# Patient Record
Sex: MALE | Race: WHITE | NOT HISPANIC OR LATINO | Employment: UNEMPLOYED | URBAN - NONMETROPOLITAN AREA
[De-identification: names, ages, dates, MRNs, and addresses within clinical notes are randomized per-mention and may not be internally consistent; named-entity substitution may affect disease eponyms.]

---

## 2019-06-26 ENCOUNTER — APPOINTMENT (OUTPATIENT)
Dept: CT IMAGING | Facility: HOSPITAL | Age: 24
End: 2019-06-26

## 2019-06-26 ENCOUNTER — HOSPITAL ENCOUNTER (EMERGENCY)
Facility: HOSPITAL | Age: 24
Discharge: HOME | End: 2019-06-26
Attending: EMERGENCY MEDICINE | Admitting: EMERGENCY MEDICINE

## 2019-06-26 VITALS
HEIGHT: 70 IN | HEART RATE: 74 BPM | BODY MASS INDEX: 20.24 KG/M2 | WEIGHT: 141.4 LBS | OXYGEN SATURATION: 100 % | DIASTOLIC BLOOD PRESSURE: 80 MMHG | SYSTOLIC BLOOD PRESSURE: 116 MMHG | TEMPERATURE: 98 F | RESPIRATION RATE: 18 BRPM
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
## 2019-06-26 DIAGNOSIS — S13.9XXA NECK SPRAIN, INITIAL ENCOUNTER: ICD-10-CM

## 2019-06-26 DIAGNOSIS — S00.03XA CONTUSION OF SCALP, INITIAL ENCOUNTER: ICD-10-CM

## 2019-06-26 PROCEDURE — 90715 TDAP VACCINE 7 YRS/> IM: CPT | Performed by: PHYSICIAN ASSISTANT

## 2019-06-26 PROCEDURE — 70450 CT HEAD/BRAIN W/O DYE: CPT

## 2019-06-26 PROCEDURE — 99283 EMERGENCY DEPT VISIT LOW MDM: CPT

## 2019-06-26 PROCEDURE — 72125 CT NECK SPINE W/O DYE: CPT

## 2019-06-26 PROCEDURE — 90471 IMMUNIZATION ADMIN: CPT | Performed by: PHYSICIAN ASSISTANT

## 2019-06-26 PROCEDURE — 25010000002 TDAP 5-2.5-18.5 LF-MCG/0.5 SUSPENSION: Performed by: PHYSICIAN ASSISTANT

## 2019-06-26 RX ADMIN — TETANUS TOXOID, REDUCED DIPHTHERIA TOXOID AND ACELLULAR PERTUSSIS VACCINE, ADSORBED 0.5 ML: 5; 2.5; 8; 8; 2.5 SUSPENSION INTRAMUSCULAR at 20:05

## 2019-07-29 ENCOUNTER — TRANSCRIBE ORDERS (OUTPATIENT)
Dept: ADMINISTRATIVE | Facility: HOSPITAL | Age: 24
End: 2019-07-29

## 2019-07-29 DIAGNOSIS — M54.2 CERVICALGIA: Primary | ICD-10-CM

## 2019-07-29 DIAGNOSIS — R51.9 NONINTRACTABLE HEADACHE, UNSPECIFIED CHRONICITY PATTERN, UNSPECIFIED HEADACHE TYPE: ICD-10-CM

## 2020-05-25 ENCOUNTER — HOSPITAL ENCOUNTER (EMERGENCY)
Facility: HOSPITAL | Age: 25
Discharge: HOME | End: 2020-05-25
Attending: STUDENT IN AN ORGANIZED HEALTH CARE EDUCATION/TRAINING PROGRAM | Admitting: STUDENT IN AN ORGANIZED HEALTH CARE EDUCATION/TRAINING PROGRAM

## 2020-05-25 ENCOUNTER — APPOINTMENT (OUTPATIENT)
Dept: GENERAL RADIOLOGY | Facility: HOSPITAL | Age: 25
End: 2020-05-25

## 2020-05-25 VITALS
DIASTOLIC BLOOD PRESSURE: 78 MMHG | BODY MASS INDEX: 20.88 KG/M2 | RESPIRATION RATE: 19 BRPM | HEIGHT: 68 IN | SYSTOLIC BLOOD PRESSURE: 129 MMHG | TEMPERATURE: 98.1 F | WEIGHT: 137.8 LBS | OXYGEN SATURATION: 99 % | HEART RATE: 78 BPM

## 2020-05-25 DIAGNOSIS — R07.9 CHEST PAIN, UNSPECIFIED TYPE: Primary | ICD-10-CM

## 2020-05-25 LAB
ALBUMIN SERPL-MCNC: 4.5 G/DL (ref 3.5–5.2)
ALBUMIN/GLOB SERPL: 1.7 G/DL
ALP SERPL-CCNC: 80 U/L (ref 39–117)
ALT SERPL W P-5'-P-CCNC: 13 U/L (ref 1–41)
AMPHET+METHAMPHET UR QL: NEGATIVE
AMPHETAMINES UR QL: NEGATIVE
ANION GAP SERPL CALCULATED.3IONS-SCNC: 14.3 MMOL/L (ref 5–15)
AST SERPL-CCNC: 18 U/L (ref 1–40)
BARBITURATES UR QL SCN: NEGATIVE
BASOPHILS # BLD AUTO: 0.08 10*3/MM3 (ref 0–0.2)
BASOPHILS NFR BLD AUTO: 0.8 % (ref 0–1.5)
BENZODIAZ UR QL SCN: NEGATIVE
BILIRUB SERPL-MCNC: 0.3 MG/DL (ref 0.2–1.2)
BUN BLD-MCNC: 17 MG/DL (ref 6–20)
BUN/CREAT SERPL: 17.3 (ref 7–25)
BUPRENORPHINE SERPL-MCNC: NEGATIVE NG/ML
CALCIUM SPEC-SCNC: 9.7 MG/DL (ref 8.6–10.5)
CANNABINOIDS SERPL QL: POSITIVE
CHLORIDE SERPL-SCNC: 101 MMOL/L (ref 98–107)
CO2 SERPL-SCNC: 26.7 MMOL/L (ref 22–29)
COCAINE UR QL: NEGATIVE
CREAT BLD-MCNC: 0.98 MG/DL (ref 0.76–1.27)
DEPRECATED RDW RBC AUTO: 42.6 FL (ref 37–54)
EOSINOPHIL # BLD AUTO: 0.17 10*3/MM3 (ref 0–0.4)
EOSINOPHIL NFR BLD AUTO: 1.7 % (ref 0.3–6.2)
ERYTHROCYTE [DISTWIDTH] IN BLOOD BY AUTOMATED COUNT: 13.3 % (ref 12.3–15.4)
GFR SERPL CREATININE-BSD FRML MDRD: 92 ML/MIN/1.73
GLOBULIN UR ELPH-MCNC: 2.7 GM/DL
GLUCOSE BLD-MCNC: 88 MG/DL (ref 65–99)
HCT VFR BLD AUTO: 44.2 % (ref 37.5–51)
HGB BLD-MCNC: 15.3 G/DL (ref 13–17.7)
IMM GRANULOCYTES # BLD AUTO: 0.04 10*3/MM3 (ref 0–0.05)
IMM GRANULOCYTES NFR BLD AUTO: 0.4 % (ref 0–0.5)
LYMPHOCYTES # BLD AUTO: 3.74 10*3/MM3 (ref 0.7–3.1)
LYMPHOCYTES NFR BLD AUTO: 37.4 % (ref 19.6–45.3)
MCH RBC QN AUTO: 30.3 PG (ref 26.6–33)
MCHC RBC AUTO-ENTMCNC: 34.6 G/DL (ref 31.5–35.7)
MCV RBC AUTO: 87.5 FL (ref 79–97)
METHADONE UR QL SCN: NEGATIVE
MONOCYTES # BLD AUTO: 0.88 10*3/MM3 (ref 0.1–0.9)
MONOCYTES NFR BLD AUTO: 8.8 % (ref 5–12)
NEUTROPHILS # BLD AUTO: 5.09 10*3/MM3 (ref 1.7–7)
NEUTROPHILS NFR BLD AUTO: 50.9 % (ref 42.7–76)
NRBC BLD AUTO-RTO: 0 /100 WBC (ref 0–0.2)
OPIATES UR QL: NEGATIVE
OXYCODONE UR QL SCN: NEGATIVE
PCP UR QL SCN: NEGATIVE
PLATELET # BLD AUTO: 239 10*3/MM3 (ref 140–450)
PMV BLD AUTO: 9.4 FL (ref 6–12)
POTASSIUM BLD-SCNC: 3.7 MMOL/L (ref 3.5–5.2)
PROPOXYPH UR QL: NEGATIVE
PROT SERPL-MCNC: 7.2 G/DL (ref 6–8.5)
RBC # BLD AUTO: 5.05 10*6/MM3 (ref 4.14–5.8)
SODIUM BLD-SCNC: 142 MMOL/L (ref 136–145)
TRICYCLICS UR QL SCN: NEGATIVE
TROPONIN T SERPL-MCNC: <0.01 NG/ML (ref 0–0.03)
WBC NRBC COR # BLD: 10 10*3/MM3 (ref 3.4–10.8)

## 2020-05-25 PROCEDURE — 80053 COMPREHEN METABOLIC PANEL: CPT | Performed by: STUDENT IN AN ORGANIZED HEALTH CARE EDUCATION/TRAINING PROGRAM

## 2020-05-25 PROCEDURE — 85025 COMPLETE CBC W/AUTO DIFF WBC: CPT | Performed by: STUDENT IN AN ORGANIZED HEALTH CARE EDUCATION/TRAINING PROGRAM

## 2020-05-25 PROCEDURE — 99283 EMERGENCY DEPT VISIT LOW MDM: CPT

## 2020-05-25 PROCEDURE — 71046 X-RAY EXAM CHEST 2 VIEWS: CPT

## 2020-05-25 PROCEDURE — 36415 COLL VENOUS BLD VENIPUNCTURE: CPT

## 2020-05-25 PROCEDURE — 93005 ELECTROCARDIOGRAM TRACING: CPT | Performed by: STUDENT IN AN ORGANIZED HEALTH CARE EDUCATION/TRAINING PROGRAM

## 2020-05-25 PROCEDURE — 80306 DRUG TEST PRSMV INSTRMNT: CPT | Performed by: STUDENT IN AN ORGANIZED HEALTH CARE EDUCATION/TRAINING PROGRAM

## 2020-05-25 PROCEDURE — 84484 ASSAY OF TROPONIN QUANT: CPT | Performed by: STUDENT IN AN ORGANIZED HEALTH CARE EDUCATION/TRAINING PROGRAM

## 2020-05-25 RX ORDER — VENLAFAXINE 37.5 MG/1
37.5 TABLET ORAL 2 TIMES DAILY
COMMUNITY

## 2020-05-26 NOTE — ED PROVIDER NOTES
Subjective   24-year-old male who presents with 3 weeks of constant left-sided chest pain.  States it is right over his heart.  States that at times it gets worse.  Last night, approximate 24 hours ago, patient states that he ran out of breath and his chest became very tight while having sex.  Patient states that the chest tightness is always there but does become worse during periods of anxiety or stress.  States initially he thought this was just anxiety.  He does follow with a counselor currently and they are trying to get him into see a psychiatrist.  States that he talked with a friend of his who is older and wiser and told him he should come have this checked out.  The patient has no history of coronary artery disease that is known.  He has no family history of premature coronary artery disease.  Patient is not diabetic, but does smoke tobacco cigarettes.  He takes medicine for anxiety and that is it.          Review of Systems   All other systems reviewed and are negative.      Past Medical History:   Diagnosis Date   • Depression        No Known Allergies    History reviewed. No pertinent surgical history.    History reviewed. No pertinent family history.    Social History     Socioeconomic History   • Marital status: Single     Spouse name: Not on file   • Number of children: Not on file   • Years of education: Not on file   • Highest education level: Not on file   Tobacco Use   • Smoking status: Former Smoker     Packs/day: 1.00   • Smokeless tobacco: Current User     Types: Snuff   Substance and Sexual Activity   • Alcohol use: No     Frequency: Never   • Drug use: No   • Sexual activity: Defer           Objective   Physical Exam   Nursing note and vitals reviewed.      GEN: No acute distress  Head: Normocephalic, atraumatic  Eyes: Pupils equal round reactive to light  ENT: Posterior pharynx normal in appearance, oral mucosa is moist  Chest: Nontender to palpation  Cardiovascular: Regular rate  Lungs:  Clear to auscultation bilaterally  Abdomen: Soft, nontender, nondistended, no peritoneal signs  Extremities: No edema, normal appearance  Neuro: GCS 15  Psych: Mood and affect are appropriate        Procedures           ED Course  ED Course as of May 26 0635   Mon May 25, 2020   2211 EKG shows a sinus rhythm rate of 83.  No significant ST segments.  Intervals are normal.  This is a normal EKG.  Interpreted by me.    [DT]      ED Course User Index  [DT] Jose Redding MD                                           MDM  Number of Diagnoses or Management Options  Chest pain, unspecified type:   Diagnosis management comments: Differential diagnosis for this patient would include acute coronary syndrome, pulmonary embolus, thoracic aortic dissection, pericarditis, pneumonia, pneumothorax, Boerhaave syndrome, or other concerns.  Patient's chest x-ray shows no evidence of infiltrate or pneumothorax with a normal mediastinum.  I doubt thoracic aortic dissection as the pain was not maximal in onset, ripping or tearing, did not migrate, along with a normal mediastinum on chest x-ray.  At this time the pain would be atypical for pulmonary embolus as patient is PERC negative.  Patient is >3 hours with continuous pain.  Troponin will be drawn to assess for acute MI.       Amount and/or Complexity of Data Reviewed  Clinical lab tests: ordered  Decide to obtain previous medical records or to obtain history from someone other than the patient: yes  Obtain history from someone other than the patient: yes  Review and summarize past medical records: yes  Independent visualization of images, tracings, or specimens: yes        Final diagnoses:   Chest pain, unspecified type            Jose Redding MD  05/26/20 0671

## 2023-04-07 ENCOUNTER — OFFICE VISIT (OUTPATIENT)
Dept: FAMILY MEDICINE CLINIC | Facility: CLINIC | Age: 28
End: 2023-04-07
Payer: COMMERCIAL

## 2023-04-07 ENCOUNTER — LAB (OUTPATIENT)
Dept: LAB | Facility: HOSPITAL | Age: 28
End: 2023-04-07
Payer: COMMERCIAL

## 2023-04-07 VITALS
HEART RATE: 83 BPM | WEIGHT: 147.2 LBS | TEMPERATURE: 97.2 F | BODY MASS INDEX: 21.07 KG/M2 | HEIGHT: 70 IN | DIASTOLIC BLOOD PRESSURE: 82 MMHG | OXYGEN SATURATION: 99 % | SYSTOLIC BLOOD PRESSURE: 126 MMHG

## 2023-04-07 DIAGNOSIS — G47.9 SLEEP DISTURBANCE: ICD-10-CM

## 2023-04-07 DIAGNOSIS — F41.8 DEPRESSION WITH ANXIETY: Primary | ICD-10-CM

## 2023-04-07 DIAGNOSIS — F12.10 TETRAHYDROCANNABINOL (THC) USE DISORDER, MILD, ABUSE: ICD-10-CM

## 2023-04-07 DIAGNOSIS — F41.8 DEPRESSION WITH ANXIETY: ICD-10-CM

## 2023-04-07 LAB
BASOPHILS # BLD AUTO: 0.06 10*3/MM3 (ref 0–0.2)
BASOPHILS NFR BLD AUTO: 0.8 % (ref 0–1.5)
DEPRECATED RDW RBC AUTO: 39.7 FL (ref 37–54)
EOSINOPHIL # BLD AUTO: 0.13 10*3/MM3 (ref 0–0.4)
EOSINOPHIL NFR BLD AUTO: 1.7 % (ref 0.3–6.2)
ERYTHROCYTE [DISTWIDTH] IN BLOOD BY AUTOMATED COUNT: 12.2 % (ref 12.3–15.4)
HCT VFR BLD AUTO: 45.1 % (ref 37.5–51)
HGB BLD-MCNC: 15.8 G/DL (ref 13–17.7)
IMM GRANULOCYTES # BLD AUTO: 0.03 10*3/MM3 (ref 0–0.05)
IMM GRANULOCYTES NFR BLD AUTO: 0.4 % (ref 0–0.5)
LYMPHOCYTES # BLD AUTO: 2.76 10*3/MM3 (ref 0.7–3.1)
LYMPHOCYTES NFR BLD AUTO: 35.7 % (ref 19.6–45.3)
MCH RBC QN AUTO: 30.8 PG (ref 26.6–33)
MCHC RBC AUTO-ENTMCNC: 35 G/DL (ref 31.5–35.7)
MCV RBC AUTO: 87.9 FL (ref 79–97)
MONOCYTES # BLD AUTO: 0.93 10*3/MM3 (ref 0.1–0.9)
MONOCYTES NFR BLD AUTO: 12 % (ref 5–12)
NEUTROPHILS NFR BLD AUTO: 3.83 10*3/MM3 (ref 1.7–7)
NEUTROPHILS NFR BLD AUTO: 49.4 % (ref 42.7–76)
NRBC BLD AUTO-RTO: 0 /100 WBC (ref 0–0.2)
PLATELET # BLD AUTO: 231 10*3/MM3 (ref 140–450)
PMV BLD AUTO: 10.5 FL (ref 6–12)
RBC # BLD AUTO: 5.13 10*6/MM3 (ref 4.14–5.8)
WBC NRBC COR # BLD: 7.74 10*3/MM3 (ref 3.4–10.8)

## 2023-04-07 PROCEDURE — 85025 COMPLETE CBC W/AUTO DIFF WBC: CPT

## 2023-04-07 PROCEDURE — 80053 COMPREHEN METABOLIC PANEL: CPT

## 2023-04-07 PROCEDURE — 84443 ASSAY THYROID STIM HORMONE: CPT

## 2023-04-07 NOTE — PROGRESS NOTES
"      Subjective     Chief Complaint: Depression with Anxiety     Subjective          HPI:   Patient is a 27-year-old male who presents today with chief complaint of depression with anxiety.  He has a long history of depression with anxiety.  The patient states \"I want get back to where I was a year ago, when I was mentally healthy.\"  He previously tried Effexor for 3 years, and states \"I relapsed on it.\"  Associated symptoms include fatigue, tiredness, drowsiness, insomnia, rapid speech, daily alterations from elevated mood to depressed mood, and impaired memory.  He expresses concerns related to reading people's behavior, and states \" sometimes I feel like people may know what I'm thinking and what I'm going to say, before I say it but I know that's irrational.\" He previously received care at [facility], and says that they proposed the idea that he may have bipolar disorder, and/or autism.  He has requested that records be sent to this clinic.  His priority for this visit is to discuss pharmacologic and counseling therapies, and states \"whatever medication I take, I don't want to pass out at work because it's a safety hazard.\" He states \"I'd like to try lithium and I know that monitoring is required.  I'm ok with that.\" He has some concerns regarding his blood pressure, and states \" a friend of mine takes clonidine, so I would be open to trying that.\"  Patient is able to go to work (works night shift) and can complete all ADLs. He is negative for fever, chills, trouble swallowing, visual disturbances, shortness of breath, chest tightness, chest pain, palpitation, dizziness, lightheadedness, confusion, agitation, self-harm, suicidal ideation,  homicidal ideation.    The following portions of the patient's history were reviewed and updated as appropriate: allergies, current medications, past family history, past medical history, past social history, past surgical history and problem " "list.    Allergy:   No Known Allergies     Current Medications:   Current Outpatient Medications   Medication Sig Dispense Refill   • Cariprazine HCl (Vraylar) 1.5 MG capsule capsule Take 1 capsule by mouth Daily. 30 capsule 0     No current facility-administered medications for this visit.       Past Medical History:  Past Medical History:   Diagnosis Date   • Anxiety    • Depression    • Insomnia 2022   • PTSD (post-traumatic stress disorder)        Past Surgical History:  Past Surgical History:   Procedure Laterality Date   • TOOTH EXTRACTION Bilateral 2010    [place] teeth extraction       Social History:  Social History     Socioeconomic History   • Marital status: Single   Tobacco Use   • Smoking status: Every Day     Packs/day: 0.20     Years: 10.00     Pack years: 2.00     Types: Cigarettes     Start date: 2012   • Smokeless tobacco: Current     Types: Snuff   Vaping Use   • Vaping Use: Former   • Start date: 1/1/2014   • Quit date: 1/1/2022   • Substances: Nicotine, THC, CBD, Flavoring   • Devices: Disposable, Pre-filled pod   Substance and Sexual Activity   • Alcohol use: Not Currently     Comment: Very seldom   • Drug use: Yes     Types: Marijuana     Comment: Delta-8. Has not used for the past 3 months.   • Sexual activity: Defer     Partners: Male, Female     Birth control/protection: Same-sex partner       Family History:  Family History   Problem Relation Age of Onset   • Mental illness Mother         bipolar, OCD   • Hyperlipidemia Mother    • Thyroid disease Mother    • Mental illness Father    • Hypertension Father        Review of Systems:  Please see HPI      Objective       Vital Signs:   /82   Pulse 83   Temp 97.2 °F (36.2 °C) (Infrared)   Ht 177.8 cm (70\")   Wt 66.8 kg (147 lb 3.2 oz)   SpO2 99%   BMI 21.12 kg/m²      Physical Exam:  Physical Exam  Constitutional:       General: He is not in acute distress.     Appearance: He is not ill-appearing, toxic-appearing or diaphoretic. "   HENT:      Head: Normocephalic and atraumatic.      Mouth/Throat:      Mouth: Mucous membranes are moist.      Pharynx: Posterior oropharyngeal erythema present.   Eyes:      Extraocular Movements: Extraocular movements intact.      Conjunctiva/sclera: Conjunctivae normal.      Pupils: Pupils are equal, round, and reactive to light.   Cardiovascular:      Rate and Rhythm: Normal rate and regular rhythm.      Pulses: Normal pulses.      Heart sounds: Normal heart sounds.   Pulmonary:      Effort: Pulmonary effort is normal.      Breath sounds: Normal breath sounds.   Abdominal:      General: Abdomen is flat. Bowel sounds are normal.      Palpations: Abdomen is soft.   Skin:     General: Skin is warm.      Capillary Refill: Capillary refill takes less than 2 seconds.   Neurological:      Mental Status: He is alert and oriented to person, place, and time.   Psychiatric:         Attention and Perception: Attention normal.         Mood and Affect: Mood is depressed. Mood is not anxious or elated. Affect is tearful.         Speech: Speech is delayed. Speech is not rapid and pressured or slurred.         Behavior: Behavior is slowed. Behavior is not agitated, aggressive, withdrawn, hyperactive or combative. Behavior is cooperative.         Thought Content: Thought content does not include homicidal or suicidal ideation. Thought content does not include homicidal or suicidal plan.         Judgment: Judgment is not impulsive or inappropriate.         PHQ-9 Total Score: 27     BIJAN 7: 20     Assessment / Plan         Assessment and Plan   Diagnoses and all orders for this visit:    1. Depression with anxiety (Primary)  Assessment & Plan:  - Side effects/adverse effects for prescribed medications discussed.  Patient acknowledged and is in agreement with plan of care.    - Self-harm and suicidal ideation discussed face-to-face.  Support persons identified.  Patient acknowledged.    Orders:  -     Ambulatory Referral to  Behavioral Health  -     CBC & Differential; Future  -     TSH Rfx On Abnormal To Free T4; Future  -     Comprehensive Metabolic Panel; Future  -     Cariprazine HCl (Vraylar) 1.5 MG capsule capsule; Take 1 capsule by mouth Daily.  Dispense: 30 capsule; Refill: 0    2. Sleep disturbance  Assessment & Plan:  -Avoid caffeine, and stimulating activities, 1 hour prior to bedtime.    - Sleep hygiene with benefits of bedtime routine discussed.  Patient acknowledged.       Orders:  -     Comprehensive Metabolic Panel; Future    3. Tetrahydrocannabinol (THC) use disorder, mild, abuse  -     Cancel: Compliance Drug Analysis, Ur - Urine, Clean Catch; Future  -     Cancel: Compliance Drug Analysis, Ur - Urine, Clean Catch  -     Compliance Drug Analysis, Ur - Urine, Clean Catch; Future  -     Compliance Drug Analysis, Ur - Urine, Clean Catch  Follow Up   Return in about 4 weeks (around 5/5/2023), or if symptoms worsen or fail to improve.    BMI is within normal parameters. No other follow-up for BMI required.    Discussed possible differential diagnoses, testing, treatment, recommended non-pharmacological interventions, risks, warning signs to monitor for that would indicate need for follow-up in clinic or ER. If no improvement with these regimens or you have new or worsening symptoms follow-up. Patient verbalizes understanding and agreement with plan of care. Denies further needs or concerns.     Patient was given instructions and counseling regarding his condition or for health maintenance advice. Please see specific information pulled into the AVS if appropriate.     I spent approximately 45 minutes with this patient face to face, discussing best medication options to treat all presenting symptoms along with SI screening and safety plan. Shared decision making resulted in final plan of care.       This document has been electronically signed by MAGDY Patel   April 7, 2023 16:06 EDT    Dictated Utilizing Dragon  Dictation: Part of this note may be an electronic transcription/translation of spoken language to printed text using the Dragon Dictation System.    Aniket Ferguson  [facility] [place]

## 2023-04-07 NOTE — ASSESSMENT & PLAN NOTE
- Side effects/adverse effects for prescribed medications discussed.  Patient acknowledged and is in agreement with plan of care.    - Self-harm and suicidal ideation discussed face-to-face.  Support persons identified.  Patient acknowledged.

## 2023-04-07 NOTE — ASSESSMENT & PLAN NOTE
-Avoid caffeine, and stimulating activities, 1 hour prior to bedtime.    - Sleep hygiene with benefits of bedtime routine discussed.  Patient acknowledged.

## 2023-04-08 LAB
ALBUMIN SERPL-MCNC: 4.6 G/DL (ref 3.5–5.2)
ALBUMIN/GLOB SERPL: 1.8 G/DL
ALP SERPL-CCNC: 74 U/L (ref 39–117)
ALT SERPL W P-5'-P-CCNC: 18 U/L (ref 1–41)
ANION GAP SERPL CALCULATED.3IONS-SCNC: 9 MMOL/L (ref 5–15)
AST SERPL-CCNC: 23 U/L (ref 1–40)
BILIRUB SERPL-MCNC: 0.3 MG/DL (ref 0–1.2)
BUN SERPL-MCNC: 20 MG/DL (ref 6–20)
BUN/CREAT SERPL: 22.7 (ref 7–25)
CALCIUM SPEC-SCNC: 9.3 MG/DL (ref 8.6–10.5)
CHLORIDE SERPL-SCNC: 105 MMOL/L (ref 98–107)
CO2 SERPL-SCNC: 27 MMOL/L (ref 22–29)
CREAT SERPL-MCNC: 0.88 MG/DL (ref 0.76–1.27)
EGFRCR SERPLBLD CKD-EPI 2021: 119.4 ML/MIN/1.73
GLOBULIN UR ELPH-MCNC: 2.6 GM/DL
GLUCOSE SERPL-MCNC: 77 MG/DL (ref 65–99)
POTASSIUM SERPL-SCNC: 4 MMOL/L (ref 3.5–5.2)
PROT SERPL-MCNC: 7.2 G/DL (ref 6–8.5)
SODIUM SERPL-SCNC: 141 MMOL/L (ref 136–145)
TSH SERPL DL<=0.05 MIU/L-ACNC: 1.13 UIU/ML (ref 0.27–4.2)

## 2023-04-10 ENCOUNTER — TELEPHONE (OUTPATIENT)
Dept: FAMILY MEDICINE CLINIC | Facility: CLINIC | Age: 28
End: 2023-04-10

## 2023-04-10 NOTE — TELEPHONE ENCOUNTER
Caller: Juwan Waller    Relationship: Self    Best call back number: [phone]    What is the best time to reach you: ANY TIME    Who are you requesting to speak with (clinical staff, provider,  specific staff member): CHELSIE JOEL    Do you know the name of the person who called: SELF    What was the call regarding: PATIENT HAS NOT HEARD FROM BEHAVIOR HEALTH ON AN APPOINTMENT YET. PLEASE ADVISE    Do you require a callback: YES

## 2023-04-10 NOTE — TELEPHONE ENCOUNTER
Caller: Juwan Waller    Relationship: Self    Best call back number: [phone]    What medications are you currently taking:   Current Outpatient Medications on File Prior to Visit   Medication Sig Dispense Refill   • Cariprazine HCl (Vraylar) 1.5 MG capsule capsule Take 1 capsule by mouth Daily. 30 capsule 0     No current facility-administered medications on file prior to visit.          Which medication are you concerned about: DEIDRA    Who prescribed you this medication: CHELSIE JOEL    What are your concerns: PHARMACY STATES PATIENT NEEDS PRIOR AUTHORIZATION FOR THIS MEDICATION. PLEASE ADVISE

## 2023-04-11 ENCOUNTER — TELEPHONE (OUTPATIENT)
Dept: FAMILY MEDICINE CLINIC | Facility: CLINIC | Age: 28
End: 2023-04-11

## 2023-04-11 NOTE — TELEPHONE ENCOUNTER
Spoke with pharmacy and they specified diagnosis on their end, which allowed the prescription to go through.

## 2023-04-11 NOTE — TELEPHONE ENCOUNTER
Caller: Juwan Waller    Relationship: Self    Best call back number: [phone]    What medications are you currently taking:   Current Outpatient Medications on File Prior to Visit   Medication Sig Dispense Refill   • Cariprazine HCl (Vraylar) 1.5 MG capsule capsule Take 1 capsule by mouth Daily. 30 capsule 0     No current facility-administered medications on file prior to visit.      [id number] - [place], [place] - [address] AT [facility] - [phone] [facility] [phone]   [phone]    Which medication are you concerned about:   Cariprazine HCl (Vraylar) 1.5 MG capsule capsule     Who prescribed you this medication: MAGDY TRIPP    What are your concerns: PATIENT NEEDED A PRIOR AUTHORIZATION FOR THIS MEDICATION. THE PA HAS BEEN APPROVED BUT IT'S NOT GOING THROUGH ON THE PHARMACY SIDE

## 2023-04-12 ENCOUNTER — TELEPHONE (OUTPATIENT)
Dept: FAMILY MEDICINE CLINIC | Facility: CLINIC | Age: 28
End: 2023-04-12

## 2023-04-12 NOTE — TELEPHONE ENCOUNTER
Caller: Juwan Waller    Relationship: Self    Best call back number: [phone]    Caller requesting test results: YES    What test was performed: LABS     When was the test performed: 4/7/23    Where was the test performed: IN OFFICE     Additional notes: PATIENT WOULD LIKE SOMEONE TO CONTACT HIM TO GO OVER LAB RESULTS

## 2023-04-13 LAB — DRUGS UR: NORMAL

## 2023-04-17 ENCOUNTER — TELEPHONE (OUTPATIENT)
Dept: FAMILY MEDICINE CLINIC | Facility: CLINIC | Age: 28
End: 2023-04-17
Payer: OTHER MISCELLANEOUS

## 2023-04-17 ENCOUNTER — TELEPHONE (OUTPATIENT)
Dept: FAMILY MEDICINE CLINIC | Facility: CLINIC | Age: 28
End: 2023-04-17

## 2023-04-17 NOTE — TELEPHONE ENCOUNTER
Hub staff attempted to follow warm transfer process and was unsuccessful     Caller: Juwan Waller    Relationship to patient: Self    Best call back number: [phone]    Patient is needing: PATIENT WANTED TO VERIFY WHEN HIS BEHAVIORAL APPOINTMENT IS IN OFFICE.

## 2023-04-17 NOTE — TELEPHONE ENCOUNTER
Dear Juwan,   I sent you a letter on 4-, as your MyChart wasn't established yet.  Below is the summary for your lab results.       I have reviewed your lab work.  Currently, I find these results acceptable.  No further follow-up regarding lab work is needed at this time.  I will see you at your next appointment.  Hope you are doing well.       If you have any questions or concerns, please don't hesitate to let me know.

## 2023-05-01 ENCOUNTER — TELEPHONE (OUTPATIENT)
Dept: FAMILY MEDICINE CLINIC | Facility: CLINIC | Age: 28
End: 2023-05-01
Payer: OTHER MISCELLANEOUS

## 2023-05-08 ENCOUNTER — TELEPHONE (OUTPATIENT)
Dept: FAMILY MEDICINE CLINIC | Facility: CLINIC | Age: 28
End: 2023-05-08

## 2023-05-08 ENCOUNTER — OFFICE VISIT (OUTPATIENT)
Dept: FAMILY MEDICINE CLINIC | Facility: CLINIC | Age: 28
End: 2023-05-08
Payer: COMMERCIAL

## 2023-05-08 ENCOUNTER — LAB (OUTPATIENT)
Dept: LAB | Facility: HOSPITAL | Age: 28
End: 2023-05-08
Payer: COMMERCIAL

## 2023-05-08 VITALS
OXYGEN SATURATION: 98 % | TEMPERATURE: 98.2 F | HEART RATE: 90 BPM | DIASTOLIC BLOOD PRESSURE: 78 MMHG | WEIGHT: 155.2 LBS | SYSTOLIC BLOOD PRESSURE: 120 MMHG | BODY MASS INDEX: 22.27 KG/M2

## 2023-05-08 DIAGNOSIS — F41.8 DEPRESSION WITH ANXIETY: Primary | ICD-10-CM

## 2023-05-08 DIAGNOSIS — Z83.438 FAMILY HISTORY OF HYPERLIPIDEMIA: ICD-10-CM

## 2023-05-08 PROCEDURE — 80061 LIPID PANEL: CPT
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
## 2023-05-08 NOTE — TELEPHONE ENCOUNTER
I called the patient at 14:16 to discuss plan of care.  Patient unable to answer.  No voicemail was left.  I am following this with a direct message to the patient via [employer].

## 2023-05-09 LAB
CHOLEST SERPL-MCNC: 168 MG/DL (ref 0–200)
HDLC SERPL-MCNC: 75 MG/DL (ref 40–60)
LDLC SERPL CALC-MCNC: 77 MG/DL (ref 0–100)
LDLC/HDLC SERPL: 1 {RATIO}
TRIGL SERPL-MCNC: 90 MG/DL (ref 0–150)
VLDLC SERPL-MCNC: 16 MG/DL (ref 5–40)

## 2023-05-10 NOTE — ASSESSMENT & PLAN NOTE
- Suicide hotline and emergency mental health crises facilities ([place] [facility], [facility]) discussed.  Patient acknowledged and agrees to use these resources if needed.  - Behavioral health referral: The patient promises to call and make an appointment with behavioral health today.  He is going to call my office and let me know the appointment date and time.    - Shared decision making for pharmacotherapy: I recommend amitriptyline 25 mg oral nightly, as he says this provided previous relief.

## 2023-05-11 ENCOUNTER — TELEPHONE (OUTPATIENT)
Dept: FAMILY MEDICINE CLINIC | Facility: CLINIC | Age: 28
End: 2023-05-11
Payer: OTHER MISCELLANEOUS

## 2023-05-11 NOTE — TELEPHONE ENCOUNTER
----- Message from MAGDY Patel sent at 5/10/2023  8:26 PM EDT -----  Regarding: Check in  Hello,  Please call the patient and see if he is doing okay regarding depression.  Ask if he received my message regarding pharmacotherapy for depression.  I suggested Elavil 25 mg oral nightly as he has had previous success with this.     Ask if he has gotten a behavioral health appointment set yet?  If he has not, please provide him with the information below and tell him to call ASAP please.  After he makes the appointment please have him call us, or message in [employer] to let us know.    [facility]  [address] [place] [address]  PHONE #:  [phone]    Lastly, please ask him if he feels that he needs his primary care provider to call him regarding any new concerns.    Thank you for your hard work,  Miriam Zepeda

## 2023-05-12 ENCOUNTER — TELEPHONE (OUTPATIENT)
Dept: FAMILY MEDICINE CLINIC | Facility: CLINIC | Age: 28
End: 2023-05-12
Payer: OTHER MISCELLANEOUS

## 2023-05-12 NOTE — TELEPHONE ENCOUNTER
Spoke with pt about medications and behavioral health referral as suggested by Yesy Zepeda APRN   He stated that he had previously been seen by [facility] and was not satisfied with [facility]. He stated he has an appointment on the 30th of this month, but would like to be referred elsewhere later. He also stated that he would prefer that Yesy CURRAN continue to oversee his medications if possible.

## 2023-05-22 ENCOUNTER — OFFICE VISIT (OUTPATIENT)
Dept: FAMILY MEDICINE CLINIC | Facility: CLINIC | Age: 28
End: 2023-05-22
Payer: COMMERCIAL

## 2023-05-22 VITALS
HEART RATE: 89 BPM | TEMPERATURE: 97.8 F | OXYGEN SATURATION: 98 % | DIASTOLIC BLOOD PRESSURE: 76 MMHG | BODY MASS INDEX: 22.64 KG/M2 | WEIGHT: 157.8 LBS | SYSTOLIC BLOOD PRESSURE: 118 MMHG

## 2023-05-22 DIAGNOSIS — F41.8 DEPRESSION WITH ANXIETY: Primary | ICD-10-CM

## 2023-05-22 DIAGNOSIS — G47.9 SLEEP DISTURBANCE: ICD-10-CM

## 2023-05-22 RX ORDER — FLUOXETINE HYDROCHLORIDE 20 MG/1
20 CAPSULE ORAL DAILY
Qty: 30 CAPSULE | Refills: 1 | Status: SHIPPED | OUTPATIENT
Start: 2023-05-22

## 2023-05-22 RX ORDER — ARIPIPRAZOLE 10 MG/1
10 TABLET ORAL DAILY
Qty: 30 TABLET | Refills: 1 | Status: SHIPPED | OUTPATIENT
Start: 2023-05-22
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
## 2023-05-23 NOTE — ASSESSMENT & PLAN NOTE
- Emergency services for mental health needs discussed.  Short-term inpatient care discussed and encouraged.  - Confirmation of appointment with [facility] obtained.  Patient will see them on May 30.  - Side effects/adverse effects of medication regimen reviewed and discussed.  Patient acknowledged.  Patient is in agreement with the plan of care.

## 2023-05-23 NOTE — ASSESSMENT & PLAN NOTE
- Pharmacotherapy: Discussed the need for pharmacotherapy to assist with sleep.  Melatonin discussed.  Declined at this time, as patient feels with overall mental health improvement, sleep disturbance will improve.  We will reevaluate the need after he meets with [facility] this month.     -Sleep hygiene and benefits of bedtime routine discussed.  Patient acknowledged.

## 2023-05-24 ENCOUNTER — TELEPHONE (OUTPATIENT)
Dept: FAMILY MEDICINE CLINIC | Facility: CLINIC | Age: 28
End: 2023-05-24

## 2023-05-24 NOTE — TELEPHONE ENCOUNTER
THE PATIENT IS CALLING IN HE STATES THAT THE PHARMACY TOLD HIM THAT THE FLOXETINE WILL NEED A PRIOR AUTHOIZATION PLEASE CALL PATIENT TO LET HIM KNOW IF THAT CAN BE DONE  THE PATIENT STATES THAT WAS ABLE TO GET THE ARIPIPRAZOLE     CALL [phone]

## 2023-05-25 NOTE — TELEPHONE ENCOUNTER
PATIENT ALSO STATES THAT HIS ARIPiprazole (Abilify) 10 MG tablet ALSO NEEDS A PRIOR AUTHORIZATION.     PATIENT CALL BACK [phone]

## 2023-05-26 ENCOUNTER — PRIOR AUTHORIZATION (OUTPATIENT)
Dept: FAMILY MEDICINE CLINIC | Facility: CLINIC | Age: 28
End: 2023-05-26
Payer: OTHER MISCELLANEOUS

## 2023-05-26 NOTE — TELEPHONE ENCOUNTER
Pa stared 05/26/2023    Juwan Waller   Domingo: [id number]  PA Case ID: [id number]  Abilify 10MG tablets

## 2023-06-05 ENCOUNTER — TELEPHONE (OUTPATIENT)
Dept: FAMILY MEDICINE CLINIC | Facility: CLINIC | Age: 28
End: 2023-06-05
Payer: OTHER MISCELLANEOUS

## 2023-06-05 NOTE — TELEPHONE ENCOUNTER
Left VM for pt to call back.    This will be regarding rejected PA and possible [employer] options, but no details left on VM.

## 2023-06-07 RX ORDER — ARIPIPRAZOLE 10 MG/1
10 TABLET ORAL DAILY
Qty: 30 TABLET | Refills: 1 | Status: SHIPPED | OUTPATIENT
Start: 2023-06-07

## 2023-06-26 ENCOUNTER — TELEPHONE (OUTPATIENT)
Dept: FAMILY MEDICINE CLINIC | Facility: CLINIC | Age: 28
End: 2023-06-26

## 2023-06-26 NOTE — TELEPHONE ENCOUNTER
Caller: Juwan Waller    Relationship: Self    Best call back number: [phone]    What is the best time to reach you: ANYTIME    Who are you requesting to speak with (clinical staff, provider,  specific staff member): CLINICAL STAFF    Do you know the name of the person who called: SELF    What was the call regarding: PATIENT STATES THAT HE WOULD LIKE A CALL ABOUT HIS REFERRAL TO [facility].    Is it okay if the provider responds through MyChart: NO

## 2023-07-25 ENCOUNTER — OFFICE VISIT (OUTPATIENT)
Dept: FAMILY MEDICINE CLINIC | Facility: CLINIC | Age: 28
End: 2023-07-25
Payer: COMMERCIAL

## 2023-07-25 VITALS
HEIGHT: 70 IN | TEMPERATURE: 98.2 F | SYSTOLIC BLOOD PRESSURE: 108 MMHG | WEIGHT: 160.2 LBS | DIASTOLIC BLOOD PRESSURE: 60 MMHG | RESPIRATION RATE: 18 BRPM | BODY MASS INDEX: 22.94 KG/M2 | HEART RATE: 105 BPM

## 2023-07-25 DIAGNOSIS — F41.8 DEPRESSION WITH ANXIETY: Primary | ICD-10-CM

## 2023-07-25 PROCEDURE — 99213 OFFICE O/P EST LOW 20 MIN: CPT
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
## 2023-07-28 ENCOUNTER — TELEPHONE (OUTPATIENT)
Dept: FAMILY MEDICINE CLINIC | Facility: CLINIC | Age: 28
End: 2023-07-28
Payer: OTHER MISCELLANEOUS

## 2023-10-17 ENCOUNTER — OFFICE VISIT (OUTPATIENT)
Dept: FAMILY MEDICINE CLINIC | Facility: CLINIC | Age: 28
End: 2023-10-17
Payer: OTHER MISCELLANEOUS

## 2023-10-17 VITALS
SYSTOLIC BLOOD PRESSURE: 118 MMHG | TEMPERATURE: 98.1 F | DIASTOLIC BLOOD PRESSURE: 92 MMHG | BODY MASS INDEX: 22.16 KG/M2 | HEIGHT: 70 IN | HEART RATE: 90 BPM | WEIGHT: 154.8 LBS | RESPIRATION RATE: 20 BRPM

## 2023-10-17 DIAGNOSIS — F41.8 DEPRESSION WITH ANXIETY: Primary | ICD-10-CM

## 2023-10-17 PROCEDURE — 99213 OFFICE O/P EST LOW 20 MIN: CPT

## 2023-10-17 RX ORDER — LAMOTRIGINE 100 MG/1
100 TABLET ORAL DAILY
COMMUNITY
Start: 2023-09-28 | End: 2023-11-12 | Stop reason: SDDI

## 2023-10-17 RX ORDER — VILAZODONE HYDROCHLORIDE 10 MG/1
TABLET ORAL
COMMUNITY
Start: 2023-10-16 | End: 2023-11-12 | Stop reason: SDDI

## 2023-10-17 RX ORDER — PROPRANOLOL HYDROCHLORIDE 10 MG/1
TABLET ORAL
COMMUNITY
Start: 2023-08-25 | End: 2023-11-12 | Stop reason: SDDI
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
